# Patient Record
Sex: MALE | Race: WHITE | URBAN - METROPOLITAN AREA
[De-identification: names, ages, dates, MRNs, and addresses within clinical notes are randomized per-mention and may not be internally consistent; named-entity substitution may affect disease eponyms.]

---

## 2017-01-16 ENCOUNTER — HOSPITAL ENCOUNTER (EMERGENCY)
Facility: CLINIC | Age: 42
Discharge: HOME | End: 2017-01-16
Attending: EMERGENCY MEDICINE | Admitting: EMERGENCY MEDICINE
Payer: COMMERCIAL

## 2017-01-16 VITALS
RESPIRATION RATE: 16 BRPM | SYSTOLIC BLOOD PRESSURE: 134 MMHG | OXYGEN SATURATION: 97 % | TEMPERATURE: 98.2 F | HEIGHT: 71 IN | DIASTOLIC BLOOD PRESSURE: 74 MMHG

## 2017-01-16 DIAGNOSIS — R51.9 SINUS HEADACHE: ICD-10-CM

## 2017-01-16 PROCEDURE — 99282 EMERGENCY DEPT VISIT SF MDM: CPT

## 2017-01-16 RX ORDER — HYDROCODONE BITARTRATE AND ACETAMINOPHEN 5; 325 MG/1; MG/1
1 TABLET ORAL EVERY 6 HOURS PRN
Qty: 15 TABLET | Refills: 0 | Status: ON HOLD | OUTPATIENT
Start: 2017-01-16 | End: 2017-02-13

## 2017-01-16 RX ORDER — METHYLPREDNISOLONE 4 MG
TABLET, DOSE PACK ORAL SEE ADMIN INSTRUCTIONS
Status: ON HOLD | COMMUNITY
End: 2017-02-13

## 2017-01-16 ASSESSMENT — ENCOUNTER SYMPTOMS
NECK STIFFNESS: 0
NUMBNESS: 0
WEAKNESS: 0
RHINORRHEA: 1
PHOTOPHOBIA: 1
HEADACHES: 1
NECK PAIN: 0
FEVER: 0
DIARRHEA: 0
SINUS PRESSURE: 1
NAUSEA: 0
COUGH: 1
VOMITING: 0
ABDOMINAL PAIN: 0

## 2017-01-16 NOTE — ED AVS SNAPSHOT
Emergency Department    [address] [place] [address]    Phone:  [phone]    Fax:  [phone]                                       Leonel Grady   MRN: [id number]    Department:   Emergency Department   Date of Visit:  1/16/2017           After Visit Summary Signature Page     I have received my discharge instructions, and my questions have been answered. I have discussed any challenges I see with this plan with the nurse or doctor.    ..........................................................................................................................................  Patient/Patient Representative Signature      ..........................................................................................................................................  Patient Representative Print Name and Relationship to Patient    ..................................................               ................................................  Date                                            Time    ..........................................................................................................................................  Reviewed by Signature/Title    ...................................................              ..............................................  Date                                                            Time

## 2017-01-16 NOTE — ED AVS SNAPSHOT
Emergency Department    [address] [place] [address]    Phone:  [phone]    Fax:  [phone]                                       Leonel Grady   MRN: [id number]    Department:   Emergency Department   Date of Visit:  1/16/2017           Patient Information     Date Of Birth          1975        Your diagnoses for this visit were:     Sinus headache        You were seen by Gregorio Vasquez MD.      Follow-up Information     Follow up with [facility]. Schedule an appointment as soon as possible for a visit in 1 week.        Discharge Instructions         Sinus Headaches  Sinus headaches can cause a gnawing pain behind the nose and eyes. The pain most often gets worse in the afternoon and evening. You may also run a fever. Sinus headaches are caused by colds or allergies that make the nasal passages inflamed or infected.    To help prevent sinus headaches:    Treat colds promptly to keep mucus from backing up.    Avoid things that trigger sinus problems, such as pollens, dust, smoke, fumes, and strong odors.    Take allergy medications as directed by your doctor.  To relieve the pain:    Keep your sinuses open and free of mucus. Try over-the-counter sinus rinse products.    Use a nasal decongestant as directed to reduce the inflammation.    Drink fluids to keep the mucus thinner. This helps it drain more easily. You can also use a humidifier.    Apply hot packs to the area around your sinuses. Use a hot water bottle.    See your doctor if your sinus headache lasts more than two weeks. You may need medication for a sinus infection or an exam to check for other headache conditions, like migraines.    3745-6126 [employer]. [address] [place] [address], [place] [address]. All rights reserved. This information is not intended as a substitute for professional medical care. Always follow your healthcare professional's instructions.          24 Hour  Appointment Hotline       To make an appointment at any [facility], call [phone] ([phone]). If you don't have a family doctor or clinic, we will help you find one. [facility] are conveniently located to serve the needs of you and your family.             Review of your medicines      START taking        Dose / Directions Last dose taken    amoxicillin-clavulanate 875-125 MG per tablet   Commonly known as:  AUGMENTIN   Dose:  1 tablet   Quantity:  20 tablet        Take 1 tablet by mouth 2 times daily for 10 days   Refills:  0        HYDROcodone-acetaminophen 5-325 MG per tablet   Commonly known as:  NORCO   Dose:  1 tablet   Quantity:  15 tablet        Take 1 tablet by mouth every 6 hours as needed for moderate to severe pain   Refills:  0          Our records show that you are taking the medicines listed below. If these are incorrect, please call your family doctor or clinic.        Dose / Directions Last dose taken    methylPREDNISolone 4 MG tablet   Commonly known as:  MEDROL DOSEPAK        See Admin Instructions follow package directions   Refills:  0        SERTRALINE HCL PO        Take by mouth daily   Refills:  0                Prescriptions were sent or printed at these locations (2 Prescriptions)                   Other Prescriptions                Printed at Department/Unit printer (2 of 2)         HYDROcodone-acetaminophen (NORCO) 5-325 MG per tablet               amoxicillin-clavulanate (AUGMENTIN) 875-125 MG per tablet                Orders Needing Specimen Collection     None      Pending Results     No orders found from 1/15/2017 to 1/17/2017.            Pending Culture Results     No orders found from 1/15/2017 to 1/17/2017.             Test Results from your hospital stay            Clinical Quality Measure: Blood Pressure Screening     Your blood pressure was checked while you were in the emergency department today. The last reading we obtained was  BP: 142/85 mmHg . Please  "read the guidelines below about what these numbers mean and what you should do about them.  If your systolic blood pressure (the top number) is less than 120 and your diastolic blood pressure (the bottom number) is less than 80, then your blood pressure is normal. There is nothing more that you need to do about it.  If your systolic blood pressure (the top number) is 120-139 or your diastolic blood pressure (the bottom number) is 80-89, your blood pressure may be higher than it should be. You should have your blood pressure rechecked within a year by a primary care provider.  If your systolic blood pressure (the top number) is 140 or greater or your diastolic blood pressure (the bottom number) is 90 or greater, you may have high blood pressure. High blood pressure is treatable, but if left untreated over time it can put you at risk for heart attack, stroke, or kidney failure. You should have your blood pressure rechecked by a primary care provider within the next 4 weeks.  If your provider in the emergency department today gave you specific instructions to follow-up with your doctor or provider even sooner than that, you should follow that instruction and not wait for up to 4 weeks for your follow-up visit.        Thank you for choosing [facility]       Thank you for choosing [facility] for your care. Our goal is always to provide you with excellent care. Hearing back from our patients is one way we can continue to improve our services. Please take a few minutes to complete the written survey that you may receive in the mail after you visit with us. Thank you!        [employer] Information     [employer] lets you send messages to your doctor, view your test results, renew your prescriptions, schedule appointments and more. To sign up, go to [email or web address] . Click on \"Log in\" on the left side of the screen, which will take you to the Welcome page. Then click on \"Sign up Now\" on the right side of the page.     You " will be asked to enter the access code listed below, as well as some personal information. Please follow the directions to create your username and password.     Your access code is: [email or web address]  Expires: 2017  8:50 PM     Your access code will  in 90 days. If you need help or a new code, please call your [facility] clinic or [phone].        Care EveryWhere ID     This is your Care EveryWhere ID. This could be used by other organizations to access your [facility] medical records  [id number]        After Visit Summary       This is your record. Keep this with you and show to your community pharmacist(s) and doctor(s) at your next visit.

## 2017-01-17 NOTE — ED PROVIDER NOTES
History     Chief Complaint:  Headache      HPI   Leonel Grady is a 41 year old male who presents with concerns for a headache. The patient reports 2 weeks of a right sided headache with pressure which is exacerbated with sitting up. He states that the only way to alleviate his headache is by lying down. He has been taking ibuprofen and Excedrin as well, with ibuprofen providing transient alleviation. The patient has been evaluated twice for this headache, first by his CT where a CT was obtained. He was recommended by his PCP which did not help. He also was seen 3 days ago, receiving a Toradol which also did not alleviate his pain. The patient was prescribed a Medrol Dosepak at that time which has not helped his pain either. The patient is also noting mild clear rhinorrhea and a mild dry cough. The patient denies any numbness, weakness, asymmetry, abdominal pain, nausea, diarrhea, fevers or any other symptoms. Of note, the patient has flown hiren since his the onset of his headaches which made his headaches much worse.     EXAM: CT SCAN OF HEAD WITHOUT IV CONTRAST 1/11/2017 4:25 PM  FINDINGS:   No intra- or extra-axial mass or hemorrhage.  No brain contusion.  No cortical infarct.  The ventricles and sulci are normal for age.  No skull fracture.  Mastoid bones bilaterally are normally aerated.  Incompletely-evaluated paranasal sinuses are clear.    Allergies:  No known drug allergies.      Medications:    Medrol Dosepak   Sertraline       Past Medical History:    Depressive disorder      Past Surgical History:    No past surgical history on file.    Family History:    No family history on file.    Social History:  Marital Status:     PCP: [facility]     Review of Systems   Constitutional: Negative for fever.   HENT: Positive for rhinorrhea and sinus pressure.    Eyes: Positive for photophobia.   Respiratory: Positive for cough.    Gastrointestinal: Negative for nausea, vomiting,  "abdominal pain and diarrhea.   Musculoskeletal: Negative for neck pain and neck stiffness.   Neurological: Positive for headaches. Negative for weakness and numbness.   All other systems reviewed and are negative.      Physical Exam     Patient Vitals for the past 24 hrs:   BP Temp Heart Rate Resp SpO2 Height   17 142/85 mmHg 98.2  F (36.8  C) 68 16 97 % 1.803 m (5' 11\")       Physical Exam  Nursing note and vitals reviewed.  Constitutional:   Awake alert  HENT:   Pharynx normal, tms normal, atraumatic. Tenderness right frontal sinus area  Mouth/Throat:  Oropharynx is clear and moist.   Eyes:    Conjunctivae normal and EOM are normal. Pupils are equal, round, and reactive to light.   Neck:    Normal range of motion. Neck supple. No tracheal deviation present.   Cardiovascular: Normal rate, regular rhythm, normal heart sounds and intact distal pulses.    Pulmonary/Chest:  Effort normal and breath sounds normal. No respiratory distress. No wheezes. No rales. No tenderness.   Abdominal:   Soft. The patient exhibits no mass. There is no tenderness. There is no rebound and no guarding.   Musculoskeletal:  Normal range of motion. No edema and no tenderness.   Lymphadenopathy:  No cervical adenopathy.   Neurological:  Alert and oriented to person, place, and time. No cranial nerve deficit. Normal muscle tone.  Skin:    Skin is warm and dry.   Psychiatric:   Normal mood and affect.      Emergency Department Course     Emergency Department Course:  Nursing notes and vitals reviewed.  () I performed an exam of the patient as documented above.     () Patient update. Findings and plan explained to the patient. Patient discharged home with instructions regarding supportive care, medications, and reasons to return. The importance of close follow-up was reviewed. The patient was prescribed Norco and Augmentin.      Impression & Plan      Medical Decision Makin-year-old male presents with intermittent " right-sided headaches for the past two weeks.  Pain worse when he's been flying ×2 during this period.  [facility]  Head CT findings as above. Paranasal sinuses incompletely evaluated on that head CT no other acute process noted though.  Patient worse with sitting up and better when laying down.  He has had some nasal congestion.  He went back to his clinic on past Friday and they gave him Toradol injection and started him on Medrol Dosepak.  He really has no purulent nasal discharge.  No fevers.  I think this is probably sinus headacheand will trial a course of Augmentin for 10 days Narco #15.  Normal neurologic exam.  With head CT findings and history and physical exam with the mostlikely consistent with sinus headache.  Trial of antibiotics and pain meds follow-up with his clinic next week.No indication for intracranial bleed meningitis encephalitis or need for LP.      Diagnosis:    ICD-10-CM   1. Sinus headache R51       Disposition:  Patient is discharged to home.     Discharge Medications:  New Prescriptions    AMOXICILLIN-CLAVULANATE (AUGMENTIN) 875-125 MG PER TABLET    Take 1 tablet by mouth 2 times daily for 10 days    HYDROCODONE-ACETAMINOPHEN (NORCO) 5-325 MG PER TABLET    Take 1 tablet by mouth every 6 hours as needed for moderate to severe pain         Scott Montana  1/16/2017    EMERGENCY DEPARTMENT    I, Scott Montana, am serving as a scribe on 1/16/2017 at 8:14 PM to personally document services performed by Dr. Vasquez based on my observations and the provider's statements to me.      Gregorio Vasquez MD  01/16/17 [id number]

## 2017-01-17 NOTE — DISCHARGE INSTRUCTIONS
Sinus Headaches  Sinus headaches can cause a gnawing pain behind the nose and eyes. The pain most often gets worse in the afternoon and evening. You may also run a fever. Sinus headaches are caused by colds or allergies that make the nasal passages inflamed or infected.    To help prevent sinus headaches:    Treat colds promptly to keep mucus from backing up.    Avoid things that trigger sinus problems, such as pollens, dust, smoke, fumes, and strong odors.    Take allergy medications as directed by your doctor.  To relieve the pain:    Keep your sinuses open and free of mucus. Try over-the-counter sinus rinse products.    Use a nasal decongestant as directed to reduce the inflammation.    Drink fluids to keep the mucus thinner. This helps it drain more easily. You can also use a humidifier.    Apply hot packs to the area around your sinuses. Use a hot water bottle.    See your doctor if your sinus headache lasts more than two weeks. You may need medication for a sinus infection or an exam to check for other headache conditions, like migraines.    7470-4884 [employer]. [address] [place] [address], [place] [address]. All rights reserved. This information is not intended as a substitute for professional medical care. Always follow your healthcare professional's instructions.

## 2017-01-17 NOTE — ED NOTES
"Pt signed all \"release of record\" forms given to him via [facility] request. Papers were returned to [facility] via [facility].  "

## 2017-02-13 ENCOUNTER — HOSPITAL ENCOUNTER (OUTPATIENT)
Facility: CLINIC | Age: 42
Discharge: HOME | End: 2017-02-13
Attending: RADIOLOGY | Admitting: RADIOLOGY
Payer: COMMERCIAL

## 2017-02-13 ENCOUNTER — HOSPITAL ENCOUNTER (OUTPATIENT)
Dept: GENERAL RADIOLOGY | Facility: CLINIC | Age: 42
End: 2017-02-13
Attending: PSYCHIATRY & NEUROLOGY | Admitting: RADIOLOGY
Payer: COMMERCIAL

## 2017-02-13 VITALS
TEMPERATURE: 97.6 F | HEART RATE: 60 BPM | OXYGEN SATURATION: 97 % | DIASTOLIC BLOOD PRESSURE: 84 MMHG | SYSTOLIC BLOOD PRESSURE: 124 MMHG | RESPIRATION RATE: 16 BRPM

## 2017-02-13 DIAGNOSIS — R51.9 HEADACHE: ICD-10-CM

## 2017-02-13 DIAGNOSIS — G96.00 CSF LEAK: ICD-10-CM

## 2017-02-13 PROCEDURE — 40000863 ZZH STATISTIC RADIOLOGY XRAY, US, CT, MAR, NM

## 2017-02-13 PROCEDURE — 25500064 ZZH RX 255 OP 636: Performed by: PSYCHIATRY & NEUROLOGY

## 2017-02-13 PROCEDURE — 27210986

## 2017-02-13 PROCEDURE — 27211111

## 2017-02-13 PROCEDURE — 25000125 ZZHC RX 250: Performed by: PSYCHIATRY & NEUROLOGY

## 2017-02-13 RX ORDER — LIDOCAINE 40 MG/G
CREAM TOPICAL
Status: DISCONTINUED | OUTPATIENT
Start: 2017-02-13 | End: 2017-02-13 | Stop reason: HOSPADM

## 2017-02-13 RX ORDER — IOPAMIDOL 408 MG/ML
10 INJECTION, SOLUTION INTRATHECAL ONCE
Status: COMPLETED | OUTPATIENT
Start: 2017-02-13 | End: 2017-02-13

## 2017-02-13 RX ORDER — AMOXICILLIN 500 MG
CAPSULE ORAL
COMMUNITY

## 2017-02-13 RX ADMIN — IOPAMIDOL 1 ML: 408 INJECTION, SOLUTION INTRATHECAL at 11:52

## 2017-02-13 RX ADMIN — LIDOCAINE HYDROCHLORIDE 2 ML: 10 INJECTION, SOLUTION EPIDURAL; INFILTRATION; INTRACAUDAL; PERINEURAL at 11:51

## 2017-02-13 NOTE — PROGRESS NOTES
Blood patch site D/I. VSS. States understanding of discharge instructions. Discharged to door #1 to ride.

## 2017-04-03 ENCOUNTER — HOSPITAL ENCOUNTER (OUTPATIENT)
Facility: CLINIC | Age: 42
Discharge: HOME | End: 2017-04-03
Attending: RADIOLOGY | Admitting: RADIOLOGY
Payer: COMMERCIAL

## 2017-04-03 ENCOUNTER — HOSPITAL ENCOUNTER (OUTPATIENT)
Dept: GENERAL RADIOLOGY | Facility: CLINIC | Age: 42
End: 2017-04-03
Attending: PSYCHIATRY & NEUROLOGY
Payer: COMMERCIAL

## 2017-04-03 VITALS
SYSTOLIC BLOOD PRESSURE: 123 MMHG | RESPIRATION RATE: 16 BRPM | OXYGEN SATURATION: 98 % | TEMPERATURE: 97.4 F | DIASTOLIC BLOOD PRESSURE: 79 MMHG | HEART RATE: 60 BPM

## 2017-04-03 DIAGNOSIS — R51.9 HEADACHE, UNSPECIFIED HEADACHE TYPE: ICD-10-CM

## 2017-04-03 PROCEDURE — 40000863 ZZH STATISTIC RADIOLOGY XRAY, US, CT, MAR, NM

## 2017-04-03 PROCEDURE — 25500064 ZZH RX 255 OP 636: Performed by: PHYSICIAN ASSISTANT

## 2017-04-03 PROCEDURE — 27211111

## 2017-04-03 PROCEDURE — 27210986

## 2017-04-03 PROCEDURE — 25000125 ZZHC RX 250: Performed by: PHYSICIAN ASSISTANT

## 2017-04-03 RX ORDER — LIDOCAINE 40 MG/G
CREAM TOPICAL
Status: DISCONTINUED | OUTPATIENT
Start: 2017-04-03 | End: 2017-04-03 | Stop reason: HOSPADM

## 2017-04-03 RX ORDER — IOPAMIDOL 408 MG/ML
10 INJECTION, SOLUTION INTRATHECAL ONCE
Status: COMPLETED | OUTPATIENT
Start: 2017-04-03 | End: 2017-04-03

## 2017-04-03 RX ORDER — LIDOCAINE HYDROCHLORIDE 10 MG/ML
30 INJECTION, SOLUTION EPIDURAL; INFILTRATION; INTRACAUDAL; PERINEURAL ONCE
Status: COMPLETED | OUTPATIENT
Start: 2017-04-03 | End: 2017-04-03

## 2017-04-03 RX ADMIN — IOPAMIDOL 4 ML: 408 INJECTION, SOLUTION INTRATHECAL at 10:15

## 2017-04-03 RX ADMIN — LIDOCAINE HYDROCHLORIDE 3 ML: 10 INJECTION, SOLUTION EPIDURAL; INFILTRATION; INTRACAUDAL; PERINEURAL at 10:11

## 2017-04-03 NOTE — PROGRESS NOTES
Here for Blood Patch, had about a month ago.  Wife will be coming to take home Explained pre procedure and gave d/c instructions with verbalized understanding.  Resting on cart with call light in reach.

## 2017-04-03 NOTE — PROGRESS NOTES
Blood patch site D/I.VSS. Denies headache. States understanding of discharge instructions. IV DCD. Discharged to door #1 to wife.

## 2017-04-03 NOTE — IP AVS SNAPSHOT
[facility] [address]    [place] [address]    Phone:  [phone]                                       After Visit Summary   4/3/2017    Leonel Grady    MRN: [id number]           After Visit Summary Signature Page     I have received my discharge instructions, and my questions have been answered. I have discussed any challenges I see with this plan with the nurse or doctor.    ..........................................................................................................................................  Patient/Patient Representative Signature      ..........................................................................................................................................  Patient Representative Print Name and Relationship to Patient    ..................................................               ................................................  Date                                            Time    ..........................................................................................................................................  Reviewed by Signature/Title    ...................................................              ..............................................  Date                                                            Time

## 2017-04-03 NOTE — DISCHARGE INSTRUCTIONS
Blood Patch Discharge Instructions     After you go home:      You may resume your normal diet    Continue to drink at least 8 ounces of fluid every 1-2 hours until bedtime tonight and continue to drink extra fluids for the next 2 days    Caffeinated beverages may help prevent or reduce spinal headaches    Care of Puncture Site:      If there is a bandaid - you may remove it tomorrow morning    You may shower tomorrow    No tub baths, whirlpools or swimming for 48 hours     Activity - to help prevent spinal headache or spinal fluid leakage:      Minimize your activity today. Flat bedrest for 24 hrs is strongly suggested as this will help to prevent a spinal headache.  You can be up to the bathroom and for meals.    Resume normal activities tomorrow.     Avoid strenuous activity for the next 2 days    Do not drive a vehicle until tomorrow morning    Medicines:      You may resume all medications    Resume your Warfarin/Coumadin at your regular dose today. Follow up with your provider to have your INR rechecked    Resume your Platelet Inhibitors and Aspirin tomorrow at your regular dose    For minor pain, you may take Acetaminophen (Tylenol) or Ibuprofen (Advil)            Call the provider who ordered this procedure if:      Your headache becomes worse or is severe. (A minor headache is not unusual)    You have nausea or vomiting    The site is red, swollen, hot or tender    You have chills or a fever greater than 101 F (38 C)    Any questions or concerns    If you have questions call:        [facility] Radiology Dept @ [phone]    The provider who performed your procedure was Dr Escobar.

## 2017-04-03 NOTE — PROGRESS NOTES
RADIOLOGY PROCEDURE NOTE  Patient name: Leonel Grady  MRN: [id number]  : 1975    Pre-procedure diagnosis: Headache  Post-procedure diagnosis: Same    Procedure Date/Time: April 3, 2017  10:31 AM  Procedure: Blood patch, L1  Estimated blood loss: None  Specimen(s) collected with description: none  The patient tolerated the procedure well with no immediate complications.  Significant findings:none    See imaging dictation for procedural details.    Provider name: Jhoan Hylton  Assistant(s):None

## 2017-04-03 NOTE — IP AVS SNAPSHOT
MRN:[id number]                      After Visit Summary   4/3/2017    Leonel Grady    MRN: [id number]           Visit Information        Department      4/3/2017  9:15 AM [facility]          Review of your medicines      UNREVIEWED medicines. Ask your doctor about these medicines        Dose / Directions    B-12 1000 MCG Tbcr        Refills:  0       Fish Oil 1200 MG Caps        Refills:  0       SERTRALINE HCL PO        Take by mouth daily   Refills:  0       VITAMIN D (CHOLECALCIFEROL) PO        Dose:  2000 Units   Take 2,000 Units by mouth daily   Refills:  0                Protect others around you: Learn how to safely use, store and throw away your medicines at www.disposemymeds.org.         Follow-ups after your visit        Your next 10 appointments already scheduled     Apr 03, 2017 10:00 AM CDT   XR BLOOD PATCH with SHXR4, [facility] RAD   [facility] ([facility])    [address] [place] [id number] [phone]           Please bring a list of your current medicines to your exam. (Include vitamins, minerals and over-the-counter medicines.) Leave your valuables at home.  Do not eat for 3 hours prior to appointment. You may continue to drink fluids (4-6 glasses) prior to appointment.  Please stop taking these medicines (talk with doctor first):   Antidepressants   Anti-pyschotics  Please call the Imaging Department at your exam site with any questions.               Care Instructions        Further instructions from your care team       Blood Patch Discharge Instructions     After you go home:      You may resume your normal diet    Continue to drink at least 8 ounces of fluid every 1-2 hours until bedtime tonight and continue to drink extra fluids for the next 2 days    Caffeinated beverages may help prevent or reduce spinal headaches    Care of Puncture Site:      If there is a bandaid - you may remove it tomorrow  "morning    You may shower tomorrow    No tub baths, whirlpools or swimming for 48 hours     Activity - to help prevent spinal headache or spinal fluid leakage:      Minimize your activity today. Flat bedrest for 24 hrs is strongly suggested as this will help to prevent a spinal headache.  You can be up to the bathroom and for meals.    Resume normal activities tomorrow.     Avoid strenuous activity for the next 2 days    Do not drive a vehicle until tomorrow morning    Medicines:      You may resume all medications    Resume your Warfarin/Coumadin at your regular dose today. Follow up with your provider to have your INR rechecked    Resume your Platelet Inhibitors and Aspirin tomorrow at your regular dose    For minor pain, you may take Acetaminophen (Tylenol) or Ibuprofen (Advil)            Call the provider who ordered this procedure if:      Your headache becomes worse or is severe. (A minor headache is not unusual)    You have nausea or vomiting    The site is red, swollen, hot or tender    You have chills or a fever greater than 101 F (38 C)    Any questions or concerns    If you have questions call:        [facility] Radiology Dept @ [phone]    The provider who performed your procedure was Dr Escobar.       Additional Information About Your Visit        [employer] Information     [employer] lets you send messages to your doctor, view your test results, renew your prescriptions, schedule appointments and more. To sign up, go to [email or web address] . Click on \"Log in\" on the left side of the screen, which will take you to the Welcome page. Then click on \"Sign up Now\" on the right side of the page.     You will be asked to enter the access code listed below, as well as some personal information. Please follow the directions to create your username and password.     Your access code is: [email or web address]  Expires: 2017  9:50 PM     Your access code will  in 90 days. If you need help or a new " code, please call your [facility] clinic or [phone].        Care EveryWhere ID     This is your Care EveryWhere ID. This could be used by other organizations to access your [facility] medical records  [id number]        Your Vitals Were     Blood Pressure Pulse Temperature Respirations Pulse Oximetry       123/82 (BP Location: Right arm) 60 97.4  F (36.3  C) (Oral) 18 98%        Primary Care Provider Office Phone # Fax #    Brook Jansen [phone]      Thank you!     Thank you for choosing [facility] for your care. Our goal is always to provide you with excellent care. Hearing back from our patients is one way we can continue to improve our services. Please take a few minutes to complete the written survey that you may receive in the mail after you visit with us. Thank you!             Medication List: This is a list of all your medications and when to take them. Check marks below indicate your daily home schedule. Keep this list as a reference.      Medications           Morning Afternoon Evening Bedtime As Needed    B-12 1000 MCG Tbcr                                Fish Oil 1200 MG Caps                                SERTRALINE HCL PO   Take by mouth daily                                VITAMIN D (CHOLECALCIFEROL) PO   Take 2,000 Units by mouth daily

## 2017-04-03 NOTE — PROGRESS NOTES
Pt tolerated Blood Patch well and back to room at 1030.  Wife will be coming to take home at 1145.  On flat bedrest until 1130.  Drinking liquids.  See flow sheet.

## 2022-06-14 NOTE — PROGRESS NOTES
Tolerated blood patch well, done by . States H/A remains 6/10. PO fluids given and encouraged. Report given to Srinivasa SMITH.   Was done for at least one hour

## (undated) RX ORDER — LIDOCAINE HYDROCHLORIDE 10 MG/ML
INJECTION, SOLUTION EPIDURAL; INFILTRATION; INTRACAUDAL; PERINEURAL
Status: DISPENSED
Start: 2017-04-03

## (undated) RX ORDER — LIDOCAINE HYDROCHLORIDE 10 MG/ML
INJECTION, SOLUTION EPIDURAL; INFILTRATION; INTRACAUDAL; PERINEURAL
Status: DISPENSED
Start: 2017-02-13